# Patient Record
Sex: MALE | Race: OTHER | HISPANIC OR LATINO | URBAN - METROPOLITAN AREA
[De-identification: names, ages, dates, MRNs, and addresses within clinical notes are randomized per-mention and may not be internally consistent; named-entity substitution may affect disease eponyms.]

---

## 2023-02-22 ENCOUNTER — EMERGENCY (EMERGENCY)
Facility: HOSPITAL | Age: 48
LOS: 1 days | Discharge: HOME | End: 2023-02-22
Attending: STUDENT IN AN ORGANIZED HEALTH CARE EDUCATION/TRAINING PROGRAM
Payer: MEDICAID

## 2023-02-22 VITALS
TEMPERATURE: 99 F | HEART RATE: 74 BPM | WEIGHT: 215.61 LBS | OXYGEN SATURATION: 98 % | RESPIRATION RATE: 16 BRPM | SYSTOLIC BLOOD PRESSURE: 135 MMHG | HEIGHT: 64 IN | DIASTOLIC BLOOD PRESSURE: 92 MMHG

## 2023-02-22 VITALS
HEART RATE: 71 BPM | TEMPERATURE: 98 F | OXYGEN SATURATION: 98 % | RESPIRATION RATE: 17 BRPM | DIASTOLIC BLOOD PRESSURE: 85 MMHG | SYSTOLIC BLOOD PRESSURE: 131 MMHG

## 2023-02-22 LAB
ALBUMIN SERPL ELPH-MCNC: 4 G/DL — SIGNIFICANT CHANGE UP (ref 3.5–5)
ALP SERPL-CCNC: 69 U/L — SIGNIFICANT CHANGE UP (ref 40–120)
ALT FLD-CCNC: 43 U/L DA — SIGNIFICANT CHANGE UP (ref 10–60)
ANION GAP SERPL CALC-SCNC: 4 MMOL/L — LOW (ref 5–17)
APPEARANCE UR: CLEAR — SIGNIFICANT CHANGE UP
AST SERPL-CCNC: 27 U/L — SIGNIFICANT CHANGE UP (ref 10–40)
BACTERIA # UR AUTO: ABNORMAL /HPF
BASOPHILS # BLD AUTO: 0.01 K/UL — SIGNIFICANT CHANGE UP (ref 0–0.2)
BASOPHILS NFR BLD AUTO: 0.1 % — SIGNIFICANT CHANGE UP (ref 0–2)
BILIRUB SERPL-MCNC: 0.5 MG/DL — SIGNIFICANT CHANGE UP (ref 0.2–1.2)
BILIRUB UR-MCNC: ABNORMAL
BUN SERPL-MCNC: 12 MG/DL — SIGNIFICANT CHANGE UP (ref 7–18)
CALCIUM SERPL-MCNC: 9.2 MG/DL — SIGNIFICANT CHANGE UP (ref 8.4–10.5)
CHLORIDE SERPL-SCNC: 105 MMOL/L — SIGNIFICANT CHANGE UP (ref 96–108)
CO2 SERPL-SCNC: 27 MMOL/L — SIGNIFICANT CHANGE UP (ref 22–31)
COHGB MFR BLDV: 0.9 % — SIGNIFICANT CHANGE UP
COLOR SPEC: YELLOW — SIGNIFICANT CHANGE UP
CREAT SERPL-MCNC: 0.69 MG/DL — SIGNIFICANT CHANGE UP (ref 0.5–1.3)
DIFF PNL FLD: ABNORMAL
EGFR: 113 ML/MIN/1.73M2 — SIGNIFICANT CHANGE UP
EOSINOPHIL # BLD AUTO: 0.12 K/UL — SIGNIFICANT CHANGE UP (ref 0–0.5)
EOSINOPHIL NFR BLD AUTO: 1.5 % — SIGNIFICANT CHANGE UP (ref 0–6)
EPI CELLS # UR: ABNORMAL /HPF
GLUCOSE SERPL-MCNC: 91 MG/DL — SIGNIFICANT CHANGE UP (ref 70–99)
GLUCOSE UR QL: 50 MG/DL
HCT VFR BLD CALC: 42.6 % — SIGNIFICANT CHANGE UP (ref 39–50)
HGB BLD-MCNC: 14.2 G/DL — SIGNIFICANT CHANGE UP (ref 13–17)
IMM GRANULOCYTES NFR BLD AUTO: 0.5 % — SIGNIFICANT CHANGE UP (ref 0–0.9)
KETONES UR-MCNC: ABNORMAL
LEUKOCYTE ESTERASE UR-ACNC: NEGATIVE — SIGNIFICANT CHANGE UP
LYMPHOCYTES # BLD AUTO: 1.89 K/UL — SIGNIFICANT CHANGE UP (ref 1–3.3)
LYMPHOCYTES # BLD AUTO: 22.9 % — SIGNIFICANT CHANGE UP (ref 13–44)
MCHC RBC-ENTMCNC: 28.9 PG — SIGNIFICANT CHANGE UP (ref 27–34)
MCHC RBC-ENTMCNC: 33.3 GM/DL — SIGNIFICANT CHANGE UP (ref 32–36)
MCV RBC AUTO: 86.8 FL — SIGNIFICANT CHANGE UP (ref 80–100)
MONOCYTES # BLD AUTO: 0.56 K/UL — SIGNIFICANT CHANGE UP (ref 0–0.9)
MONOCYTES NFR BLD AUTO: 6.8 % — SIGNIFICANT CHANGE UP (ref 2–14)
NEUTROPHILS # BLD AUTO: 5.65 K/UL — SIGNIFICANT CHANGE UP (ref 1.8–7.4)
NEUTROPHILS NFR BLD AUTO: 68.2 % — SIGNIFICANT CHANGE UP (ref 43–77)
NITRITE UR-MCNC: NEGATIVE — SIGNIFICANT CHANGE UP
NRBC # BLD: 0 /100 WBCS — SIGNIFICANT CHANGE UP (ref 0–0)
PH UR: 9 — HIGH (ref 5–8)
PLATELET # BLD AUTO: 224 K/UL — SIGNIFICANT CHANGE UP (ref 150–400)
POTASSIUM SERPL-MCNC: 3.8 MMOL/L — SIGNIFICANT CHANGE UP (ref 3.5–5.3)
POTASSIUM SERPL-SCNC: 3.8 MMOL/L — SIGNIFICANT CHANGE UP (ref 3.5–5.3)
PROT SERPL-MCNC: 7.7 G/DL — SIGNIFICANT CHANGE UP (ref 6–8.3)
PROT UR-MCNC: 500 MG/DL
RBC # BLD: 4.91 M/UL — SIGNIFICANT CHANGE UP (ref 4.2–5.8)
RBC # FLD: 12.9 % — SIGNIFICANT CHANGE UP (ref 10.3–14.5)
RBC CASTS # UR COMP ASSIST: SIGNIFICANT CHANGE UP /HPF (ref 0–2)
SODIUM SERPL-SCNC: 136 MMOL/L — SIGNIFICANT CHANGE UP (ref 135–145)
SP GR SPEC: 1.01 — SIGNIFICANT CHANGE UP (ref 1.01–1.02)
UROBILINOGEN FLD QL: NEGATIVE — SIGNIFICANT CHANGE UP
WBC # BLD: 8.27 K/UL — SIGNIFICANT CHANGE UP (ref 3.8–10.5)
WBC # FLD AUTO: 8.27 K/UL — SIGNIFICANT CHANGE UP (ref 3.8–10.5)
WBC UR QL: SIGNIFICANT CHANGE UP /HPF (ref 0–5)

## 2023-02-22 PROCEDURE — 99284 EMERGENCY DEPT VISIT MOD MDM: CPT | Mod: 25

## 2023-02-22 PROCEDURE — 99284 EMERGENCY DEPT VISIT MOD MDM: CPT

## 2023-02-22 PROCEDURE — 82375 ASSAY CARBOXYHB QUANT: CPT

## 2023-02-22 PROCEDURE — 87086 URINE CULTURE/COLONY COUNT: CPT

## 2023-02-22 PROCEDURE — 80053 COMPREHEN METABOLIC PANEL: CPT

## 2023-02-22 PROCEDURE — 71046 X-RAY EXAM CHEST 2 VIEWS: CPT | Mod: 26

## 2023-02-22 PROCEDURE — 85025 COMPLETE CBC W/AUTO DIFF WBC: CPT

## 2023-02-22 PROCEDURE — 99283 EMERGENCY DEPT VISIT LOW MDM: CPT | Mod: 25

## 2023-02-22 PROCEDURE — 71046 X-RAY EXAM CHEST 2 VIEWS: CPT

## 2023-02-22 PROCEDURE — 36415 COLL VENOUS BLD VENIPUNCTURE: CPT

## 2023-02-22 PROCEDURE — 96361 HYDRATE IV INFUSION ADD-ON: CPT

## 2023-02-22 PROCEDURE — 96374 THER/PROPH/DIAG INJ IV PUSH: CPT

## 2023-02-22 PROCEDURE — 81001 URINALYSIS AUTO W/SCOPE: CPT

## 2023-02-22 RX ORDER — METOCLOPRAMIDE HCL 10 MG
10 TABLET ORAL ONCE
Refills: 0 | Status: DISCONTINUED | OUTPATIENT
Start: 2023-02-22 | End: 2023-02-22

## 2023-02-22 RX ORDER — SODIUM CHLORIDE 9 MG/ML
1000 INJECTION INTRAMUSCULAR; INTRAVENOUS; SUBCUTANEOUS ONCE
Refills: 0 | Status: COMPLETED | OUTPATIENT
Start: 2023-02-22 | End: 2023-02-22

## 2023-02-22 RX ORDER — KETOROLAC TROMETHAMINE 30 MG/ML
15 SYRINGE (ML) INJECTION ONCE
Refills: 0 | Status: DISCONTINUED | OUTPATIENT
Start: 2023-02-22 | End: 2023-02-22

## 2023-02-22 RX ADMIN — Medication 15 MILLIGRAM(S): at 18:27

## 2023-02-22 RX ADMIN — SODIUM CHLORIDE 1000 MILLILITER(S): 9 INJECTION INTRAMUSCULAR; INTRAVENOUS; SUBCUTANEOUS at 17:55

## 2023-02-22 RX ADMIN — SODIUM CHLORIDE 1000 MILLILITER(S): 9 INJECTION INTRAMUSCULAR; INTRAVENOUS; SUBCUTANEOUS at 18:55

## 2023-02-22 RX ADMIN — Medication 15 MILLIGRAM(S): at 17:57
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
## 2023-02-22 NOTE — ED PROVIDER NOTE - PATIENT PORTAL LINK FT
You can access the FollowMyHealth Patient Portal offered by [facility] by registering at the following website: [email or web address]. By joining [facility]’s FollowMyHealth portal, you will also be able to view your health information using other applications (apps) compatible with our system.
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
## 2023-02-22 NOTE — ED PROVIDER NOTE - NSFOLLOWUPINSTRUCTIONS_ED_ALL_ED_FT
You were seen for flank pain and throat pain.    No signs of emergency medical condition on today's workup.  Your results are attached with your discharge instructions, please review them with your primary care physician. If there is a result pending, you will receive a call if test is positive.    A presumptive diagnosis is made today, but further evaluation may be required by your primary care doctor and/or specialist for a definitive diagnosis. Therefore, follow up as directed and if symptoms change/worsen or any emergency conditions, please return to the ER.    For pain or fever you can ibuprofen (motrin, advil) or tylenol as needed, as directed on packaging.    If needed, call patient access services at [phone] to find a primary care doctor, or call at [phone] to make an appointment at the clinic.
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
## 2023-02-23 LAB
CULTURE RESULTS: SIGNIFICANT CHANGE UP
SPECIMEN SOURCE: SIGNIFICANT CHANGE UP

## 2023-04-29 ENCOUNTER — EMERGENCY (EMERGENCY)
Facility: HOSPITAL | Age: 48
LOS: 1 days | Discharge: HOME | End: 2023-04-29
Attending: STUDENT IN AN ORGANIZED HEALTH CARE EDUCATION/TRAINING PROGRAM
Payer: MEDICAID

## 2023-04-29 VITALS
WEIGHT: 169.98 LBS | OXYGEN SATURATION: 98 % | DIASTOLIC BLOOD PRESSURE: 100 MMHG | HEIGHT: 64 IN | RESPIRATION RATE: 16 BRPM | SYSTOLIC BLOOD PRESSURE: 149 MMHG | HEART RATE: 81 BPM | TEMPERATURE: 98 F

## 2023-04-29 PROCEDURE — 99283 EMERGENCY DEPT VISIT LOW MDM: CPT

## 2023-04-29 PROCEDURE — 99284 EMERGENCY DEPT VISIT MOD MDM: CPT

## 2023-04-29 RX ORDER — GLYCERIN, LIDOCAINE, PETROLATUM, AND PHENYLEPHRINE HYDROCHLORIDE 144; 50; 150; 2.5 MG/G; MG/G; MG/G; MG/G
1 CREAM TOPICAL
Qty: 28 | Refills: 0
Start: 2023-04-29

## 2023-04-29 NOTE — ED PROVIDER NOTE - CLINICAL SUMMARY MEDICAL DECISION MAKING FREE TEXT BOX
Tej: 47 year old male with no pertinent PMH presents with anal pain x 1 week. Pt reports anal pain and swelling along external anus x 1 week. Denies any fevers, chest pain, shortness of breath, abdominal pain, vomiting, diarrhea, bloody stools, black tarry stools, dysuria, headache, vision change, numbness, weakness, or rash. Patient also admits to dry cough over the past 2 weeks. On exam, lungs CTAB, external hemorrhoid visualized at 9 o'clock position, nonthrombosed, no bleeding. Discussed supportive treatment, PMD/GI follow up/return precautions, pt understood and agreeable with plan.

## 2023-04-29 NOTE — ED ADULT NURSE NOTE - TEMPLATE
Detail Level: Detailed Add 44896 Cpt? (Important Note: In 2017 The Use Of 51011 Is Being Tracked By Cms To Determine Future Global Period Reimbursement For Global Periods): yes General

## 2023-04-29 NOTE — ED PROVIDER NOTE - NSFOLLOWUPINSTRUCTIONS_ED_ALL_ED_FT
Hemorroides  Hemorrhoids       Las hemorroides son venas inflamadas adentro o alrededor del recto o del ano. Hay dos tipos de hemorroides:    Hemorroides internas. Se conchita en las venas del interior del recto. Pueden abultarse hacia afuera, irritarse y doler.  Hemorroides externas. Se producen en las venas externas del ano y pueden sentirse elle un bulto o efren hinchada, dura y dolorosa cerca del ano.    La mayoría de las hemorroides no causan problemas graves y se pueden controlar con tratamientos caseros elle los cambios en la dieta y el estilo de jose. Si los tratamientos caseros no ayudan con los síntomas, se pueden realizar procedimientos para reducir o extirpar las hemorroides.    ¿Cuáles son las causas?  La causa de esta afección es el aumento de la presión en la efren anal. Esta presión puede ser causada por distintos factores, por ejemplo:    Estreñimiento.  Hacer un gran esfuerzo para defecar.  Diarrea.  Embarazo.  Obesidad.  Estar sentado jonnie largos períodos de tiempo.  Levantar objetos pesados u otras actividades que impliquen esfuerzo.  Sexo anal.  Andar en bicicleta por un nilson período de tiempo.    ¿Cuáles son los signos o los síntomas?  Los síntomas de esta afección incluyen los siguientes:    Dolor.  Picazón o irritación anal.  Sangrado rectal.  Pérdida de materia fecal (heces).  Inflamación anal.  Miguel o más bultos alrededor del ano.    ¿Cómo se diagnostica?  Esta afección se diagnostica frecuentemente a través de un examen visual. Posiblemente le realicen otros tipos de pruebas o estudios, elle los siguientes:    Un examen que implica palpar el área rectal con la mano enguantada (examen rectal digital).  Un examen del canal anal que se realiza utilizando un pequeño tubo (anoscopio).  Análisis de nitin si ha perdido smooth cantidad significativa de nitin.  Smooth prueba que consiste en la observación del interior del colon utilizando un tubo flexible con smooth cámara en el extremo (sigmoidoscopia o colonoscopía).    ¿Cómo se trata?  Esta afección generalmente se puede tratar en el hogar. Sin embargo, se pueden realizar diversos procedimientos si los cambios en la dieta, en el estilo de jose y otros tratamientos caseros no alivian los síntomas. Estos procedimientos pueden ayudar a reducir o extirpar las hemorroides completamente. Algunos de estos procedimientos son quirúrgicos y otros no. Algunos de los procedimientos más frecuentes son los siguientes:    Ligadura con holbrook elástica. Las bandas elásticas se colocan en la base de las hemorroides para interrumpir bowie irrigación de nitin.  Escleroterapia. Se inyecta un medicamento en las hemorroides para reducir bowie tamaño.  Coagulación con lyla infrarroja. Se utiliza un tipo de energía lumínica para eliminar las hemorroides.  Hemorroidectomía. Las hemorroides se extirpan con cirugía y las venas que las irrigan se atan.  Hemorroidopexia con grapas. El cirujano engrapa la base de las hemorroides a la pared del recto.    Siga estas indicaciones en bowie casa:      Comida y bebida     Consuma alimentos con alto contenido de fibra, elle cereales integrales, porotos, sherie secos, frutas y verduras.  Pregúntele a bowie médico acerca de shirlene productos con fibra añadida en ellos (complementos de fibra).  Disminuya la cantidad de grasa de la dieta. [place] se puede lograr consumiendo productos lácteos con bajo contenido de grasas, ingiriendo isidoro cantidad de vivian lopez y evitando los alimentos procesados.  Miracle suficiente líquido elle para mantener la orina de color amarillo pálido.        Control del dolor y la hinchazón     [place] tiffany de asiento tibios jonnie 20 minutos, 3 o 4 veces por día para calmar el dolor y las molestias. Puede hacer esto en smooth bañera o usar un dispositivo portátil para baño de asiento que se coloca sobre el inodoro.  Si se lo indican, aplique hielo en la efren afectada. Usar compresas de hielo entre los tiffany de asiento puede ser efectivo.    Ponga el hielo en smooth bolsa plástica.  Coloque smooth toalla entre la piel y la bolsa.  Coloque el hielo jonnie 20 minutos, 2 a 3 veces por día.        Indicaciones generales    [place] los medicamentos de venta earlene y los recetados solamente elle se lo haya indicado el médico.  Aplíquese los medicamentos, cremas o supositorios elle se lo hayan indicado.  Leola ejercicio con regularidad. Consulte al médico qué cantidad y qué tipo de ejercicio es mejor para usted. En general, debe realizar al menos 30 minutos de ejercicio moderado la mayoría de los días de la semana (150 minutos cada semana). [place] puede incluir actividades elle caminar, andar en bicicleta o practicar yoga.  Vaya al baño cuando sienta la necesidad de defecar. No espere.  Evite hacer fuerza en las deposiciones.  Mantenga la efren anal limpia y seca. Use papel higiénico húmedo o toallitas humedecidas después de las deposiciones.  No pase mucho tiempo sentado en el inodoro. [place] aumenta la afluencia de nitin y el dolor.  Concurra a todas las visitas de seguimiento elle se lo haya indicado el médico. [place] es importante.    Comuníquese con un médico si tiene:  Aumento del dolor y la hinchazón que no puede controlar con medicamentos o tratamiento.  No puede defecar o lo hace con dificultad.  Dolor o tiene inflamación fuera de la efren de las hemorroides.    Solicite ayuda inmediatamente si tiene:  Hemorragia descontrolada en el recto.    Resumen  Las hemorroides son venas inflamadas adentro o alrededor del recto o del ano.  La mayoría de las hemorroides se pueden controlar con tratamientos caseros elle cambios en la dieta y el estilo de jose.  [place]irl[place] tiffany de asiento con agua tibia puede ayudar a aliviar el dolor y las molestias.  En los casos graves, se pueden realizar procedimientos o smooth cirugía para reducir o extirpar las hemorroides.

## 2023-04-29 NOTE — ED PROVIDER NOTE - OBJECTIVE STATEMENT
#[id number]    47 year old male with no pertinent PMH presents with anal pain x 1 week. Pt reports anal pain and swelling along external anus x 1 week. Denies any fevers, chest pain, shortness of breath, abdominal pain, vomiting, diarrhea, bloody stools, black tarry stools, dysuria, headache, vision change, numbness, weakness, or rash. Patient also admits to dry cough over the past 2 weeks. Denies any additional complaints.

## 2023-04-29 NOTE — ED PROVIDER NOTE - PHYSICAL EXAMINATION
CONSTITUTIONAL: non-toxic, well appearing  SKIN: no rash, no petechiae.  EYES: PERRL, EOMI, pink conjunctiva, anicteric  ENT: tongue and uvular midline, no exudates, moist mucous membranes  NECK: Supple; no meningismus, no JVD  CARD: RRR, no murmurs, equal radial pulses bilaterally 2+  RESP: CTAB, no respiratory distress  ABD: Soft, non-tender, non-distended, no peritoneal signs, no CVA tenderness  : Chaperone IDA Flowers. External hemorrhoid visualized at 9 o'clock position, nonthrombosed, no bleeding  EXT: Normal ROM x4. No edema.   NEURO: Alert, oriented. Neuro exam nonfocal  PSYCH: Cooperative, appropriate.

## 2023-04-29 NOTE — ED PROVIDER NOTE - NSFOLLOWUPCLINICS_GEN_ALL_ED_FT
[facility]  Gastroenterology  [address] [place] [address]  Phone: [phone]  Fax: [phone]

## 2023-04-29 NOTE — ED ADULT TRIAGE NOTE - CHIEF COMPLAINT QUOTE
c/o rectal pain x 1 week, pt states he feels there is like a ball in his anus, denies bleeding, pt adds he aslo wants to be evaluated for a cough he has had for a while and that it hurts his ribs when he coughs

## 2023-04-29 NOTE — ED PROVIDER NOTE - PATIENT PORTAL LINK FT
You can access the FollowMyHealth Patient Portal offered by [facility] by registering at the following website: [email or web address]. By joining ""’s FollowMyHealth portal, you will also be able to view your health information using other applications (apps) compatible with our system.

## 2023-04-29 NOTE — ED ADULT NURSE NOTE - OBJECTIVE STATEMENT
47y M reports rectal pain and swelling denies bleeding, also reports dry cough x2 weeks, denies fever, CP or SOB, no signs of respiratory distress, breath sounds even and unlabored, pt denies any other medical hx

## 2024-06-05 NOTE — ED ADULT TRIAGE NOTE - TELEPHONIC ID NUMBER OF THE INTERPRETER
Most Recent NINO 7 Score       NINO 7 Score NINO 7 Score   6/5/2024  10:20 AM 1      Recent PHQ 2/9 Score    PHQ 2:  PHQ 2 Score Adult PHQ 2 Score Adult PHQ 2 Interpretation Little interest or pleasure in activity?   6/5/2024  10:05 AM 5 Further screening needed 3       PHQ 9:  PHQ 9 Score Adult PHQ 9 Score Adult PHQ 9 Interpretation   6/5/2024  10:05 AM 18 Moderately Severe Depression        [id number]

## 2024-10-15 ENCOUNTER — EMERGENCY (EMERGENCY)
Facility: HOSPITAL | Age: 49
LOS: 1 days | Discharge: HOME | End: 2024-10-15
Attending: EMERGENCY MEDICINE
Payer: SELF-PAY

## 2024-10-15 VITALS
HEART RATE: 79 BPM | TEMPERATURE: 98 F | SYSTOLIC BLOOD PRESSURE: 135 MMHG | OXYGEN SATURATION: 98 % | RESPIRATION RATE: 19 BRPM | WEIGHT: 178.57 LBS | DIASTOLIC BLOOD PRESSURE: 84 MMHG

## 2024-10-15 LAB
ALBUMIN SERPL ELPH-MCNC: 3.9 G/DL — SIGNIFICANT CHANGE UP (ref 3.5–5)
ALP SERPL-CCNC: 70 U/L — SIGNIFICANT CHANGE UP (ref 40–120)
ALT FLD-CCNC: 37 U/L DA — SIGNIFICANT CHANGE UP (ref 10–60)
ANION GAP SERPL CALC-SCNC: 4 MMOL/L — LOW (ref 5–17)
APPEARANCE UR: ABNORMAL
APTT BLD: 33 SEC — SIGNIFICANT CHANGE UP (ref 24.5–35.6)
AST SERPL-CCNC: 24 U/L — SIGNIFICANT CHANGE UP (ref 10–40)
BACTERIA # UR AUTO: ABNORMAL /HPF
BASOPHILS # BLD AUTO: 0.02 K/UL — SIGNIFICANT CHANGE UP (ref 0–0.2)
BASOPHILS NFR BLD AUTO: 0.2 % — SIGNIFICANT CHANGE UP (ref 0–2)
BILIRUB SERPL-MCNC: 0.4 MG/DL — SIGNIFICANT CHANGE UP (ref 0.2–1.2)
BILIRUB UR-MCNC: NEGATIVE — SIGNIFICANT CHANGE UP
BUN SERPL-MCNC: 16 MG/DL — SIGNIFICANT CHANGE UP (ref 7–18)
CALCIUM SERPL-MCNC: 9.3 MG/DL — SIGNIFICANT CHANGE UP (ref 8.4–10.5)
CHLORIDE SERPL-SCNC: 109 MMOL/L — HIGH (ref 96–108)
CO2 SERPL-SCNC: 27 MMOL/L — SIGNIFICANT CHANGE UP (ref 22–31)
COLOR SPEC: ABNORMAL
CREAT SERPL-MCNC: 0.9 MG/DL — SIGNIFICANT CHANGE UP (ref 0.5–1.3)
DIFF PNL FLD: NEGATIVE — SIGNIFICANT CHANGE UP
EGFR: 103 ML/MIN/1.73M2 — SIGNIFICANT CHANGE UP
EOSINOPHIL # BLD AUTO: 0.07 K/UL — SIGNIFICANT CHANGE UP (ref 0–0.5)
EOSINOPHIL NFR BLD AUTO: 0.8 % — SIGNIFICANT CHANGE UP (ref 0–6)
ETHANOL SERPL-MCNC: <3 MG/DL — SIGNIFICANT CHANGE UP (ref 0–10)
GLUCOSE SERPL-MCNC: 100 MG/DL — HIGH (ref 70–99)
GLUCOSE UR QL: NEGATIVE MG/DL — SIGNIFICANT CHANGE UP
HCT VFR BLD CALC: 45.6 % — SIGNIFICANT CHANGE UP (ref 39–50)
HGB BLD-MCNC: 15 G/DL — SIGNIFICANT CHANGE UP (ref 13–17)
IMM GRANULOCYTES NFR BLD AUTO: 0.2 % — SIGNIFICANT CHANGE UP (ref 0–0.9)
INR BLD: 1.01 RATIO — SIGNIFICANT CHANGE UP (ref 0.85–1.16)
KETONES UR-MCNC: 15 MG/DL
LACTATE SERPL-SCNC: 0.8 MMOL/L — SIGNIFICANT CHANGE UP (ref 0.7–2)
LEUKOCYTE ESTERASE UR-ACNC: ABNORMAL
LIDOCAIN IGE QN: 25 U/L — SIGNIFICANT CHANGE UP (ref 13–75)
LYMPHOCYTES # BLD AUTO: 2.05 K/UL — SIGNIFICANT CHANGE UP (ref 1–3.3)
LYMPHOCYTES # BLD AUTO: 23.7 % — SIGNIFICANT CHANGE UP (ref 13–44)
MCHC RBC-ENTMCNC: 29.4 PG — SIGNIFICANT CHANGE UP (ref 27–34)
MCHC RBC-ENTMCNC: 32.9 GM/DL — SIGNIFICANT CHANGE UP (ref 32–36)
MCV RBC AUTO: 89.2 FL — SIGNIFICANT CHANGE UP (ref 80–100)
MONOCYTES # BLD AUTO: 0.63 K/UL — SIGNIFICANT CHANGE UP (ref 0–0.9)
MONOCYTES NFR BLD AUTO: 7.3 % — SIGNIFICANT CHANGE UP (ref 2–14)
NEUTROPHILS # BLD AUTO: 5.87 K/UL — SIGNIFICANT CHANGE UP (ref 1.8–7.4)
NEUTROPHILS NFR BLD AUTO: 67.8 % — SIGNIFICANT CHANGE UP (ref 43–77)
NITRITE UR-MCNC: NEGATIVE — SIGNIFICANT CHANGE UP
NRBC # BLD: 0 /100 WBCS — SIGNIFICANT CHANGE UP (ref 0–0)
PH UR: 7.5 — SIGNIFICANT CHANGE UP (ref 5–8)
PLATELET # BLD AUTO: 212 K/UL — SIGNIFICANT CHANGE UP (ref 150–400)
POTASSIUM SERPL-MCNC: 4.1 MMOL/L — SIGNIFICANT CHANGE UP (ref 3.5–5.3)
POTASSIUM SERPL-SCNC: 4.1 MMOL/L — SIGNIFICANT CHANGE UP (ref 3.5–5.3)
PROT SERPL-MCNC: 7.5 G/DL — SIGNIFICANT CHANGE UP (ref 6–8.3)
PROT UR-MCNC: NEGATIVE MG/DL — SIGNIFICANT CHANGE UP
PROTHROM AB SERPL-ACNC: 11.7 SEC — SIGNIFICANT CHANGE UP (ref 9.9–13.4)
RBC # BLD: 5.11 M/UL — SIGNIFICANT CHANGE UP (ref 4.2–5.8)
RBC # FLD: 14 % — SIGNIFICANT CHANGE UP (ref 10.3–14.5)
RBC CASTS # UR COMP ASSIST: 0 /HPF — SIGNIFICANT CHANGE UP (ref 0–4)
SODIUM SERPL-SCNC: 140 MMOL/L — SIGNIFICANT CHANGE UP (ref 135–145)
SP GR SPEC: 1.02 — SIGNIFICANT CHANGE UP (ref 1–1.03)
UROBILINOGEN FLD QL: 1 MG/DL — SIGNIFICANT CHANGE UP (ref 0.2–1)
WBC # BLD: 8.66 K/UL — SIGNIFICANT CHANGE UP (ref 3.8–10.5)
WBC # FLD AUTO: 8.66 K/UL — SIGNIFICANT CHANGE UP (ref 3.8–10.5)
WBC UR QL: 3 /HPF — SIGNIFICANT CHANGE UP (ref 0–5)

## 2024-10-15 PROCEDURE — 70450 CT HEAD/BRAIN W/O DYE: CPT | Mod: MC

## 2024-10-15 PROCEDURE — 99285 EMERGENCY DEPT VISIT HI MDM: CPT

## 2024-10-15 PROCEDURE — 71260 CT THORAX DX C+: CPT | Mod: 26,MC

## 2024-10-15 PROCEDURE — 85610 PROTHROMBIN TIME: CPT

## 2024-10-15 PROCEDURE — 72125 CT NECK SPINE W/O DYE: CPT | Mod: MC

## 2024-10-15 PROCEDURE — 81001 URINALYSIS AUTO W/SCOPE: CPT

## 2024-10-15 PROCEDURE — 74177 CT ABD & PELVIS W/CONTRAST: CPT | Mod: 26,MC

## 2024-10-15 PROCEDURE — 85025 COMPLETE CBC W/AUTO DIFF WBC: CPT

## 2024-10-15 PROCEDURE — 85730 THROMBOPLASTIN TIME PARTIAL: CPT

## 2024-10-15 PROCEDURE — 36415 COLL VENOUS BLD VENIPUNCTURE: CPT

## 2024-10-15 PROCEDURE — 73030 X-RAY EXAM OF SHOULDER: CPT

## 2024-10-15 PROCEDURE — 99284 EMERGENCY DEPT VISIT MOD MDM: CPT | Mod: 25

## 2024-10-15 PROCEDURE — 72125 CT NECK SPINE W/O DYE: CPT | Mod: 26,MC

## 2024-10-15 PROCEDURE — 74177 CT ABD & PELVIS W/CONTRAST: CPT | Mod: MC

## 2024-10-15 PROCEDURE — 80307 DRUG TEST PRSMV CHEM ANLYZR: CPT

## 2024-10-15 PROCEDURE — 73030 X-RAY EXAM OF SHOULDER: CPT | Mod: 26,RT

## 2024-10-15 PROCEDURE — T1013: CPT

## 2024-10-15 PROCEDURE — 82962 GLUCOSE BLOOD TEST: CPT

## 2024-10-15 PROCEDURE — 83690 ASSAY OF LIPASE: CPT

## 2024-10-15 PROCEDURE — 70450 CT HEAD/BRAIN W/O DYE: CPT | Mod: 26,MC

## 2024-10-15 PROCEDURE — 73502 X-RAY EXAM HIP UNI 2-3 VIEWS: CPT

## 2024-10-15 PROCEDURE — 83605 ASSAY OF LACTIC ACID: CPT

## 2024-10-15 PROCEDURE — 71260 CT THORAX DX C+: CPT | Mod: MC

## 2024-10-15 PROCEDURE — 73502 X-RAY EXAM HIP UNI 2-3 VIEWS: CPT | Mod: 26,LT

## 2024-10-15 PROCEDURE — 80053 COMPREHEN METABOLIC PANEL: CPT

## 2024-10-15 PROCEDURE — 96374 THER/PROPH/DIAG INJ IV PUSH: CPT | Mod: XU

## 2024-10-15 RX ORDER — SODIUM CHLORIDE 0.9 % (FLUSH) 0.9 %
1000 SYRINGE (ML) INJECTION ONCE
Refills: 0 | Status: COMPLETED | OUTPATIENT
Start: 2024-10-15 | End: 2024-10-15

## 2024-10-15 RX ORDER — CYCLOBENZAPRINE HCL 10 MG
1 TABLET ORAL
Qty: 20 | Refills: 0
Start: 2024-10-15 | End: 2024-10-19

## 2024-10-15 RX ORDER — KETOROLAC TROMETHAMINE 10 MG/1
15 TABLET, FILM COATED ORAL ONCE
Refills: 0 | Status: DISCONTINUED | OUTPATIENT
Start: 2024-10-15 | End: 2024-10-15

## 2024-10-15 RX ADMIN — Medication 1000 MILLILITER(S): at 21:49

## 2024-10-15 RX ADMIN — KETOROLAC TROMETHAMINE 15 MILLIGRAM(S): 10 TABLET, FILM COATED ORAL at 21:49

## 2024-10-15 NOTE — ED PROVIDER NOTE - NSFOLLOWUPCLINICS_GEN_ALL_ED_FT
[facility]  Internal Medicine  [address] [place] [address]  Phone: [phone]  Fax: [phone]  Follow Up Time: 1-3 Days    [facility]  Neurology  [address] [place] [address]  Phone: [phone]  Fax: [phone]  Follow Up Time: 4-6 Days
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
## 2024-10-15 NOTE — ED PROVIDER NOTE - PATIENT PORTAL LINK FT
You can access the FollowMyHealth Patient Portal offered by [facility] by registering at the following website: [email or web address]. By joining [facility]’s FollowMyHealth portal, you will also be able to view your health information using other applications (apps) compatible with our system.
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
## 2024-10-15 NOTE — ED PROVIDER NOTE - PROGRESS NOTE DETAILS
Results reviewed.  Patient reports feeling better.  Nonfocal neuro. No cspine tenderness. No neurologic symptoms w ROM of neck.  Tolerating PO intake.  Patient/Family advised regarding symptomatic/supportive care, importance of outpatient follow up, and symptoms to prompt ED return.    : [id number]
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
## 2024-10-15 NOTE — ED PROVIDER NOTE - PHYSICAL EXAMINATION
Head is normocephalic and atraumatic. No head tenderness or skull depression on palpation. No temporal cord-like sensation, increased warmth or tenderness. Pt is alert and oriented x 3, able to follow commands. No facial asymmetry. Pupils 3 mm equally round with PERRL, no nystagmus, EOM intact. Cranial nerves III-XII grossly intact. Coordination nose-to-finger intact. All limbs equally strong bilaterally 5/5. No pronator drift. No numbness or tingling sensation.  No spinal tenderness. Neck is supple with full range of motion. No nuchal rigidity. +b/l cervical paraspinal tenderness with trapezius muscle tenderness.

## 2024-10-15 NOTE — ED PROVIDER NOTE - NSFOLLOWUPINSTRUCTIONS_ED_ALL_ED_FT
Please follow up with your PMD or Medicine Clinic in 2-3 days.  Follow up with Neurology in 1 week.  Return to the ER for worsening or concerning symptoms.  Your results for testing will be available in the Follow My Health application which can be downloaded to your phone or checked online on a computer.  [email or web address].  Take Acetaminophen (Tylenol) 650mg every 6 hours as needed for pain or fever.  Take Ibuprofen (Advil or Motrin) 600mg every 6 hours as needed for pain or fever with food.  Take Cyclobenzaprine (Flexeril) 10mg every 8 hours as needed for pain/muscle spasm. This medication can be sedating. Do not mix sedatives, drink alcohol, or operative car/heavy machinery when using this medication.  - - - - - - - - - - - - -  Por favor, consulte con bowie médico de cabecera o con bowie clínica de medicina en 2-3 días.  Consulte con el neurólogo en 1 semana.  Regrese a la yanira de emergencias si los síntomas empeoran o son preocupantes.  Los resultados de nida pruebas estarán disponibles en la aplicación "[employer]", que puede descargarse a bowie teléfono o consultarse en línea en smooth computadora.  [email or web address].  Escalante acetaminofén (Tylenol) 650 mg cada 6 horas según sea necesario para el dolor o la fiebre.  Escalante ibuprofeno (Advil o Motrin) 600 mg cada 6 horas según sea necesario para el dolor o la fiebre con alimentos.  Escalante ciclobenzaprina (Flexeril) 10 mg cada 8 horas según sea necesario para el dolor/espasmo muscular. Carri medicamento puede ser sedante. No mezcle sedantes, no kyle alcohol ni conduzca un automóvil o maquinaria pesada cuando use carri medicamento.  - - - - - - - - - - - - - -  Conmoción cerebral en los adultos  Concussion, Adult  Three rear views of the head showing how quick, sudden head movements injure the brain.  Smooth conmoción cerebral es smooth lesión en el cerebro a causa de un golpe sophia y directo (traumatismo) en la guicho o el cuerpo. Carri golpe directo hace que el cerebro se sacuda rápidamente hacia atrás y hacia adelante dentro del cráneo. Vineyard puede dañar las células cerebrales y causar cambios químicos en el cerebro. Smooth conmoción cerebral también puede conocerse elle traumatismo craneoencefálico (TCE) leve.    Los efectos de smooth conmoción cerebral pueden ser graves. Si usted sufre smooth conmoción cerebral, debe tener mucho cuidado para evitar sufrir smooth segunda conmoción cerebral.    ¿Cuáles son las causas?  Las causas de esta afección son:  Un golpe directo en la guicho.  Un movimiento repentino del cuerpo que hace que el cerebro se mueva hacia atrás y hacia adelante dentro del cráneo, elle en un choque de automóvil.  ¿Cuáles son los signos o síntomas?  Los signos de smooth conmoción cerebral pueden ser difíciles de notar. En un primer momento, los pacientes, familiares y profesionales dolores vez no los adviertan. Puede ser que usted se hipolito amy por fuera, tyron que actúe o se sienta diferente.    Cada lesión en la guicho es diferente. Por lo general, los síntomas son temporales, tyron pueden durar algunos días, semanas o incluso meses. Algunos síntomas aparecen de inmediato; sin embargo, otros quizás no se manifiesten sino hasta en horas o días.    Síntomas físicos    Liset de guicho.  Mareos y problemas de coordinación o equilibrio.  Sensibilidad a la lyla o los ruidos.  Náuseas o vómitos.  Cansancio (fatiga).  Problemas de visión o audición.  Convulsiones.  Síntomas mentales y emocionales    Irritabilidad o cambios de humor.  Problemas de memoria.  Dificultad para concentrarse, organizarse o shirlene decisiones.  Cambios en los hábitos de alimentación o en el sueño.  Lentitud para pensar, actuar o reaccionar, hablar o leer.  Ansiedad o depresión.  ¿Cómo se diagnostica?  Esta afección se diagnostica en función de los síntomas y de la lesión.    También pueden hacerle pruebas, que incluyen las siguientes:  Pruebas de diagnóstico por imágenes, elle smooth exploración por tomografía computarizada (TC) o smooth resonancia magnética (RM).  Pruebas neuropsicológicas. Con ellas, se examinan el pensamiento, la comprensión, el aprendizaje y la memoria.  ¿Cómo se trata?  El tratamiento de esta afección incluye lo siguiente:  Detener la práctica de deportes o actividades si está lesionado.  Reposo físico y mental y observación atenta, por lo general, en el hogar.  Medicamentos para ayudar con los síntomas tales elle dolor de guicho, náuseas o dificultad para dormir.  Derivación a smooth clínica o centro de rehabilitación que se especialice en conmociones cerebrales.  Siga estas instrucciones en bowie casa:  Actividad    Limite las actividades que requieren mucha atención o concentración, elle las siguientes:  Trabajos escolares o trabajos relacionados con el empleo.  Mirar televisión.  Usar la computadora o el teléfono.  Jugar juegos de memoria y armar rompecabezas.  El reposo favorece la curación del cerebro. Asegúrese de hacer lo siguiente:  Duerma lo suficiente. La mayoría de los adultos debería dormir entre 7 y 9 horas todas las noches.  Descanse jonnie el día. Escalante siestas o leola pausas cuando se sienta cansado.  Evite el ejercicio de emmanuel intensidad y las actividades físicas que exijan mucho esfuerzo. Interrumpa cualquier actividad que empeore los síntomas. El médico puede recomendarle ejercicios ligeros, elle caminar.  No realice actividades de alto riesgo que pudieran provocar smooth segunda conmoción cerebral, elle andar en bicicleta o practicar deportes.  Pregúntele al médico cuándo puede retomar nida actividades normales, elle la escuela, el trabajo, los deportes y conducir. La capacidad para reaccionar puede ser más lenta luego de smooth lesión cerebral. Nunca realice estas actividades si se siente mareado.  Instrucciones generales    A bottle of beer, a glass of wine, and a glass of hard liquor with a "do not drink" sign over them.   Use los medicamentos de venta earlene y los recetados solamente elle se lo haya indicado el médico. Algunos medicamentos, elle los anticoagulantes y la aspirina, pueden aumentar el riesgo de sufrir complicaciones, elle hemorragias.  Evite shirlene analgésicos opioides mientras se recupera de smooth conmoción cerebral.  No kyle alcohol hasta que el médico se lo autorice. Beber alcohol puede demorar bowie recuperación y ponerlo en riesgo de nuevas lesiones.  Controle nida síntomas y pídales a otras personas cercanas que también lo keyshawn. En algunos casos pueden aparecer complicaciones luego de smooth conmoción cerebral.  Informe a bowie jefe en el trabajo, los maestros, el departamento de enfermería de la escuela, el consejero escolar o el entrenador deportivo acerca de bowie lesión, los síntomas y las restricciones que tiene.  Consulte a un terapeuta de dave mental si se siente ansioso o deprimido. Controlar esta afección puede ser un desafío.  Concurra a todas las visitas de seguimiento. El médico verá cómo va bowie recuperación y le dará un plan para volver a nida actividades.  ¿Cómo se previene?  Es muy importante que evite otra lesión cerebral. En casos poco frecuentes, smooth nueva lesión puede causar daños cerebrales permanentes, hinchazón del cerebro o la muerte. El riesgo es mayor jonnie los primeros 7 a 10 días después de smooth lesión en la guicho. Evite las lesiones:  Suspenda las actividades que podrían causar smooth segunda conmoción cerebral, elle deportes de contacto o recreativos, hasta que bowie médico lo autorice.  Escalante estas medidas smooth vez que haya regresado a la práctica de deportes o actividades:  Evite las jugadas o movimientos que puedan ocasionar que choque contra otra persona. Esta es la forma en que ocurre la mayoría de las conmociones cerebrales.  Siga las reglas y respete a los demás jugadores. No se involucre en jugadas violentas o ilegales.  Leola ejercicio con regularidad que incluya entrenamiento de fuerza y equilibrio.  Use un lissette que le calce amy jonnie la práctica de deportes, al andar en bicicleta o al realizar otras actividades. Los cascos pueden ayudar a protegerlo de las lesiones graves en el cráneo y el cerebro, tyron posiblemente no lo protejan de smooth conmoción cerebral. Incluso al usar lissette, debe tratar de no recibir un golpe en la guicho.  Dónde obtener más información  Centers for Disease Control and Prevention (Centros para el Control y la Prevención de Enfermedades): cdc.gov  Comuníquese con un médico si:  Los síntomas no mejoran o empeoran.  Aparecen nuevos síntomas.  Sufre smooth nueva lesión.  Bowie coordinación empeora.  Tiene cambios de conducta inusuales.  Solicite ayuda de inmediato si:  Tiene dolor de guicho intenso o que empeora.  Tiene debilidad o adormecimiento en cualquier parte del cuerpo, dificultad para articular palabras, cambios en la visión o confusión.  Vomita repetidas veces.  Pierde la conciencia, tiene más sueño de lo normal o tiene dificultad para despertarse.  Tiene smooth convulsión.  Estos síntomas pueden indicar smooth emergencia. Solicite ayuda de inmediato. Llame al 911.  No espere a srikanth si los síntomas desaparecen.  No conduzca por nida propios medios hasta el hospital.  Solicite ayuda de inmediato también si:  Piensa acerca de lastimarse o lastimar a otras personas.  Siga alguno de estos pasos si siente que puede lastimarse a sí mismo o a otras personas, o tiene pensamientos de poner fin a bowie jose:  Diríjase al centro de urgencias más cercano.  Llame al 911.  Llame a National Suicide Prevention Lifeline (Línea [employer] Nacional para la Prevención del Suicidio) al 0-304-758-9779 o al 988. Está disponible las 24 horas del día.  Envíe un mensaje de texto a la línea para casos de crisis al [id number].  Esta información no tiene elle fin reemplazar el consejo del médico. Asegúrese de hacerle al médico cualquier pregunta que tenga.

## 2024-10-15 NOTE — ED PROVIDER NOTE - NEUROLOGICAL, MLM
Alert and oriented to person, place and time, no focal deficits, no motor or sensory deficits. EOM intact.

## 2024-10-15 NOTE — ED ADULT NURSE NOTE - NSFALLUNIVINTERV_ED_ALL_ED
Bed/Stretcher in lowest position, wheels locked, appropriate side rails in place/Call bell, personal items and telephone in reach/Instruct patient to call for assistance before getting out of bed/chair/stretcher/Non-slip footwear applied when patient is off stretcher/[place] to call system/Physically safe environment - no spills, clutter or unnecessary equipment/Purposeful proactive rounding/Room/bathroom lighting operational, light cord in reach

## 2024-10-16 VITALS
HEART RATE: 60 BPM | TEMPERATURE: 98 F | RESPIRATION RATE: 18 BRPM | DIASTOLIC BLOOD PRESSURE: 88 MMHG | SYSTOLIC BLOOD PRESSURE: 134 MMHG | OXYGEN SATURATION: 100 %

## 2024-10-16 PROBLEM — Z78.9 OTHER SPECIFIED HEALTH STATUS: Chronic | Status: ACTIVE | Noted: 2023-04-29

## 2024-10-16 PROBLEM — Z00.00 ENCOUNTER FOR PREVENTIVE HEALTH EXAMINATION: Status: ACTIVE | Noted: 2024-10-16

## 2024-10-18 ENCOUNTER — OUTPATIENT (OUTPATIENT)
Dept: OUTPATIENT SERVICES | Facility: HOSPITAL | Age: 49
LOS: 1 days | End: 2024-10-18
Payer: SELF-PAY

## 2024-10-18 ENCOUNTER — APPOINTMENT (OUTPATIENT)
Dept: INTERNAL MEDICINE | Facility: CLINIC | Age: 49
End: 2024-10-18
Payer: SELF-PAY

## 2024-10-18 VITALS
HEIGHT: 67 IN | OXYGEN SATURATION: 98 % | SYSTOLIC BLOOD PRESSURE: 121 MMHG | WEIGHT: 194 LBS | BODY MASS INDEX: 30.45 KG/M2 | DIASTOLIC BLOOD PRESSURE: 80 MMHG | HEART RATE: 69 BPM | TEMPERATURE: 97.9 F | RESPIRATION RATE: 18 BRPM

## 2024-10-18 DIAGNOSIS — M54.2 CERVICALGIA: ICD-10-CM

## 2024-10-18 DIAGNOSIS — S06.0XAA CONCUSSION WITH LOSS OF CONSCIOUSNESS STATUS UNKNOWN, INITIAL ENCOUNTER: ICD-10-CM

## 2024-10-18 DIAGNOSIS — H54.7 UNSPECIFIED VISUAL LOSS: ICD-10-CM

## 2024-10-18 DIAGNOSIS — Z00.00 ENCOUNTER FOR GENERAL ADULT MEDICAL EXAMINATION WITHOUT ABNORMAL FINDINGS: ICD-10-CM

## 2024-10-18 DIAGNOSIS — H57.10 OCULAR PAIN, UNSPECIFIED EYE: ICD-10-CM
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
## 2024-10-18 DIAGNOSIS — M54.50 LOW BACK PAIN, UNSPECIFIED: ICD-10-CM

## 2024-10-18 DIAGNOSIS — M25.551 PAIN IN RIGHT HIP: ICD-10-CM

## 2024-10-18 DIAGNOSIS — M25.559 PAIN IN UNSPECIFIED HIP: ICD-10-CM

## 2024-10-18 PROCEDURE — G2211 COMPLEX E/M VISIT ADD ON: CPT | Mod: 95

## 2024-10-18 PROCEDURE — G0463: CPT

## 2024-10-18 PROCEDURE — 99204 OFFICE O/P NEW MOD 45 MIN: CPT | Mod: 95

## 2024-10-18 RX ORDER — DEXTRAN 70/HYPROMELLOSE 0.1%-0.3%
0.1-0.3 DROPS OPHTHALMIC (EYE) 3 TIMES DAILY
Qty: 1 | Refills: 0 | Status: ACTIVE | COMMUNITY
Start: 2024-10-18 | End: 1900-01-01

## 2024-10-18 RX ORDER — DICLOFENAC SODIUM 10 MG/G
1 GEL TOPICAL DAILY
Qty: 1 | Refills: 0 | Status: ACTIVE | COMMUNITY
Start: 2024-10-18 | End: 1900-01-01

## 2024-10-18 RX ORDER — LIDOCAINE 5% 700 MG/1
5 PATCH TOPICAL
Qty: 30 | Refills: 0 | Status: ACTIVE | COMMUNITY
Start: 2024-10-18 | End: 1900-01-01

## 2024-10-18 RX ORDER — OXYCODONE 5 MG/1
5 TABLET ORAL EVERY 8 HOURS
Qty: 15 | Refills: 0 | Status: ACTIVE | COMMUNITY
Start: 2024-10-18 | End: 1900-01-01

## 2024-10-30 DIAGNOSIS — H54.7 UNSPECIFIED VISUAL LOSS: ICD-10-CM
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
## 2024-10-30 DIAGNOSIS — M25.551 PAIN IN RIGHT HIP: ICD-10-CM

## 2024-10-30 DIAGNOSIS — S06.0XAA CONCUSSION WITH LOSS OF CONSCIOUSNESS STATUS UNKNOWN, INITIAL ENCOUNTER: ICD-10-CM

## 2024-10-30 DIAGNOSIS — M54.2 CERVICALGIA: ICD-10-CM

## 2024-10-30 DIAGNOSIS — M54.50 LOW BACK PAIN, UNSPECIFIED: ICD-10-CM

## 2024-11-11 ENCOUNTER — NON-APPOINTMENT (OUTPATIENT)
Age: 49
End: 2024-11-11

## 2025-05-21 NOTE — ED ADULT NURSE NOTE - HISTORY OF COVID-19 VACCINATION
A sample will be sent for further testing (culture) to grow the bacteria and test susceptibility. If there is a need to change or add a medication based on this results, you will be notified.    Blood work has been ordered, this may be done at any [facility] or  laboratory.  
No